# Patient Record
(demographics unavailable — no encounter records)

---

## 2025-01-17 NOTE — REASON FOR VISIT
[Symptom and Test Evaluation] : symptom and test evaluation [FreeTextEntry1] : Patient was seen secondary to recurrent vagal syncopal events. Stress echo was ordered but we are unable to approve the study. echo completed. study is normal. mother wants to know why the vagal episodes occur and how to prevent them. Stress echo was ordered. Study also declined. we are discussing an evaluation.  Now s/p stress ekg, as we unable to approve a stress echo.

## 2025-01-17 NOTE — DISCUSSION/SUMMARY
[FreeTextEntry1] : Palps/murmur dizziness check echo if able to approve and schedule. EKG section of the chart --- secondary to symptoms above an electrocardiogram also known as an EKG was performed.  Risks and benefits discussed with the patient. Patient was given time and privacy to changed into a gown. Shortly after, standard 10 leads were applied and a [employer] system was used to perform the study. The results were subsequently reviewed by attending physician and discussed with the patient. The study showed a normal sinus rhythm and no ST-T suggestive of ischemia. Order for the EKG was placed in the chart. The results were documented. Billing submitted. [EKG obtained to assist in diagnosis and management of assessed problem(s)] : EKG obtained to assist in diagnosis and management of assessed problem(s) no

## 2025-01-17 NOTE — DISCUSSION/SUMMARY
[FreeTextEntry1] : Palps/murmur dizziness check echo if able to approve and schedule. EKG section of the chart --- secondary to symptoms above an electrocardiogram also known as an EKG was performed.  Risks and benefits discussed with the patient. Patient was given time and privacy to changed into a gown. Shortly after, standard 10 leads were applied and a [employer] system was used to perform the study. The results were subsequently reviewed by attending physician and discussed with the patient. The study showed a normal sinus rhythm and no ST-T suggestive of ischemia. Order for the EKG was placed in the chart. The results were documented. Billing submitted. [EKG obtained to assist in diagnosis and management of assessed problem(s)] : EKG obtained to assist in diagnosis and management of assessed problem(s)